# Patient Record
Sex: MALE | Race: WHITE | HISPANIC OR LATINO | Employment: UNEMPLOYED | URBAN - METROPOLITAN AREA
[De-identification: names, ages, dates, MRNs, and addresses within clinical notes are randomized per-mention and may not be internally consistent; named-entity substitution may affect disease eponyms.]

---

## 2022-01-26 PROBLEM — K35.80 ACUTE APPENDICITIS: Status: ACTIVE | Noted: 2022-01-26

## 2022-01-28 ENCOUNTER — TELEPHONE (OUTPATIENT)
Dept: SURGERY | Facility: CLINIC | Age: 22
End: 2022-01-28

## 2022-01-28 NOTE — TELEPHONE ENCOUNTER
Spoke with pt using  services. Post op appt scheduled. All questions answered. Pt verbalized understanding.

## 2022-01-28 NOTE — TELEPHONE ENCOUNTER
----- Message from Brittany Reyez sent at 1/28/2022  9:50 AM CST -----  Contact: [phone]  Pt is calling stating that he had surgery with the dr and the dr told him to call and make and appt with him in 7 days.    Pt would like a call back.   Needs : Aimee  [phone]

## 2022-02-03 ENCOUNTER — OFFICE VISIT (OUTPATIENT)
Dept: SURGERY | Facility: CLINIC | Age: 22
End: 2022-02-03

## 2022-02-03 VITALS
HEART RATE: 131 BPM | DIASTOLIC BLOOD PRESSURE: 82 MMHG | BODY MASS INDEX: 27.53 KG/M2 | WEIGHT: 165.25 LBS | SYSTOLIC BLOOD PRESSURE: 135 MMHG | RESPIRATION RATE: 18 BRPM | HEIGHT: 65 IN

## 2022-02-03 DIAGNOSIS — Z98.890 POST-OPERATIVE STATE: Primary | ICD-10-CM

## 2022-02-03 PROCEDURE — 99024 POSTOP FOLLOW-UP VISIT: CPT | Mod: ,,, | Performed by: SURGERY

## 2022-02-03 PROCEDURE — 99213 OFFICE O/P EST LOW 20 MIN: CPT | Mod: PBBFAC,PN | Performed by: SURGERY

## 2022-02-03 PROCEDURE — 99999 PR PBB SHADOW E&M-EST. PATIENT-LVL III: CPT | Mod: PBBFAC,,, | Performed by: SURGERY

## 2022-02-03 PROCEDURE — 99024 PR POST-OP FOLLOW-UP VISIT: ICD-10-PCS | Mod: ,,, | Performed by: SURGERY

## 2022-02-03 PROCEDURE — 99999 PR PBB SHADOW E&M-EST. PATIENT-LVL III: ICD-10-PCS | Mod: PBBFAC,,, | Performed by: SURGERY

## 2022-02-03 RX ORDER — OXYCODONE AND ACETAMINOPHEN 7.5; 325 MG/1; MG/1
1 TABLET ORAL EVERY 4 HOURS PRN
Qty: 12 TABLET | Refills: 0 | Status: SHIPPED | OUTPATIENT
Start: 2022-02-03

## 2022-02-03 NOTE — PROGRESS NOTES
"Nat Serrano is a 22 y.o. male patient.   Two weeks status post laparoscopic appendectomy.  Patient doing well.  Pain well controlled.  Tolerating diet.    No diagnosis found.  History reviewed. No pertinent past medical history.  No past surgical history pertinent negatives on file.  Scheduled Meds:  Continuous Infusions:  PRN Meds:    Review of patient's allergies indicates:  No Known Allergies  There are no hospital problems to display for this patient.    Blood pressure 135/82, pulse (!) 131, resp. rate 18, height 5' 5" (1.651 m), weight 75 kg (165 lb 3.8 oz).    Subjective:   Diet: Adequate intake.    Activity level: Returning to normal.    Pain control: Well controlled.      Objective:  Vital signs (most recent): Blood pressure 135/82, pulse (!) 131, resp. rate 18, height 5' 5" (1.651 m), weight 75 kg (165 lb 3.8 oz).  General appearance: Comfortable.    Lungs:  Normal effort.    Heart: Normal rate.    Abdomen: Abdomen is soft.    Bowel sounds:  Bowel sounds are normal.    Tenderness: There is no abdominal tenderness tenderness.    Wound:  Clean.    Extremities: There is normal range of motion.       Assessment:   Condition: In stable condition.       Status post laparoscopic appendectomy  Return to clinic [email or web address] Andrews MD  2/3/2022  "